# Patient Record
Sex: FEMALE | URBAN - METROPOLITAN AREA
[De-identification: names, ages, dates, MRNs, and addresses within clinical notes are randomized per-mention and may not be internally consistent; named-entity substitution may affect disease eponyms.]

---

## 2023-03-10 ENCOUNTER — OFFICE VISIT (OUTPATIENT)
Dept: URBAN - METROPOLITAN AREA CLINIC 30 | Facility: CLINIC | Age: 48
End: 2023-03-10
Payer: COMMERCIAL

## 2023-03-10 DIAGNOSIS — D89.811 CHRONIC GRAFT-VERSUS-HOST DISEASE: Primary | ICD-10-CM

## 2023-03-10 DIAGNOSIS — H04.123 TEAR FILM INSUFFICIENCY OF BILATERAL LACRIMAL GLANDS: ICD-10-CM

## 2023-03-10 DIAGNOSIS — H43.393 OTHER VITREOUS OPACITIES, BILATERAL: ICD-10-CM

## 2023-03-10 DIAGNOSIS — Z98.890 OTHER SPECIFIED POSTPROCEDURAL STATES: ICD-10-CM

## 2023-03-10 PROCEDURE — 92134 CPTRZ OPH DX IMG PST SGM RTA: CPT | Performed by: OPTOMETRIST

## 2023-03-10 PROCEDURE — 99203 OFFICE O/P NEW LOW 30 MIN: CPT | Performed by: OPTOMETRIST

## 2023-03-10 ASSESSMENT — INTRAOCULAR PRESSURE
OD: 18
OS: 19

## 2023-03-10 ASSESSMENT — VISUAL ACUITY
OD: 20/20
OS: 20/20

## 2023-03-10 ASSESSMENT — KERATOMETRY
OS: 43.62
OD: 43.81

## 2023-03-10 NOTE — IMPRESSION/PLAN
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 Prev AS. Now only AT's PRN.

## 2023-03-10 NOTE — IMPRESSION/PLAN
Impression: Other vitreous opacities, bilateral: H43.393. +Fhx of Mac Degen Plan: No evidence of RD or tear noted. All signs and risks of retinal detachment or tears were discussed in detail. If pt. notices any symptoms discussed, contact office ASAP. Recommend pt. return for normal recall. See MAC OCT.

## 2023-03-10 NOTE — IMPRESSION/PLAN
Impression: Tear film insufficiency of bilateral lacrimal glands: H04.123. Plan: Very mild PED. Fluctuating VA. AT's qid OU. Recommend O3's. Avoid ceiling fans. See E54.137 notes.